# Patient Record
Sex: FEMALE | Race: WHITE | NOT HISPANIC OR LATINO | Employment: OTHER | URBAN - METROPOLITAN AREA
[De-identification: names, ages, dates, MRNs, and addresses within clinical notes are randomized per-mention and may not be internally consistent; named-entity substitution may affect disease eponyms.]

---

## 2024-08-10 ENCOUNTER — HOSPITAL ENCOUNTER (EMERGENCY)
Facility: CLINIC | Age: 79
Discharge: HOME | End: 2024-08-10
Payer: COMMERCIAL

## 2024-08-10 VITALS
TEMPERATURE: 98.6 F | SYSTOLIC BLOOD PRESSURE: 145 MMHG | HEART RATE: 85 BPM | RESPIRATION RATE: 16 BRPM | OXYGEN SATURATION: 96 % | DIASTOLIC BLOOD PRESSURE: 73 MMHG

## 2024-08-10 DIAGNOSIS — S01.81XA FACIAL LACERATION, INITIAL ENCOUNTER: ICD-10-CM

## 2024-08-10 PROCEDURE — 12013 RPR F/E/E/N/L/M 2.6-5.0 CM: CPT

## 2024-08-10 PROCEDURE — G0463 HOSPITAL OUTPT CLINIC VISIT: HCPCS | Mod: 25

## 2024-08-10 ASSESSMENT — COLUMBIA-SUICIDE SEVERITY RATING SCALE - C-SSRS
1. IN THE PAST MONTH, HAVE YOU WISHED YOU WERE DEAD OR WISHED YOU COULD GO TO SLEEP AND NOT WAKE UP?: NO
2. HAVE YOU ACTUALLY HAD ANY THOUGHTS OF KILLING YOURSELF IN THE PAST MONTH?: NO
6. HAVE YOU EVER DONE ANYTHING, STARTED TO DO ANYTHING, OR PREPARED TO DO ANYTHING TO END YOUR LIFE?: NO

## 2024-08-10 ASSESSMENT — ACTIVITIES OF DAILY LIVING (ADL): ADLS_ACUITY_SCORE: 35

## 2024-08-10 NOTE — ED PROVIDER NOTES
History   No chief complaint on file.    HPI  Glenna Jones is a 79 year old female who presents to urgent care with chief complaint of facial laceration.  Patient reports that she was trying to catch her members dog who she was  prior to arrival when she fell forward hitting her face on the ground.  She was wearing sunglasses and the sunglasses made superficial lacerations just inferior to right eyebrow and lateral to left nostril.  Bleeding well-controlled at this time.  Patient denies loss of consciousness or other injury.  Patient not on blood thinning medications.  Patient is up-to-date on tetanus vaccine.    Allergies:  Not on File    Problem List:    There are no problems to display for this patient.       Past Medical History:    No past medical history on file.    Past Surgical History:    No past surgical history on file.    Family History:    No family history on file.    Social History:  Marital Status:   [2]        Medications:    No current outpatient medications on file.        Review of Systems   All other systems reviewed and are negative.      Physical Exam   BP: (!) 145/73  Pulse: 85  Temp: 98.6  F (37  C)  Resp: 16  SpO2: 96 %      Physical Exam  Vitals and nursing note reviewed.   Constitutional:       General: She is not in acute distress.     Appearance: Normal appearance. She is not toxic-appearing.   Eyes:      Extraocular Movements: Extraocular movements intact.      Conjunctiva/sclera: Conjunctivae normal.      Pupils: Pupils are equal, round, and reactive to light.   Cardiovascular:      Rate and Rhythm: Normal rate.      Pulses: Normal pulses.   Pulmonary:      Effort: Pulmonary effort is normal.   Musculoskeletal:      Cervical back: Neck supple. No rigidity.   Skin:     Comments: Bruising present around lateral right eyelid.  There is superficial laceration approximately 1 cm in length just inferior to right eyebrow.  There is another superficial laceration just  lateral to left nostril.  This laceration is approximately 2.5 cm long   Neurological:      General: No focal deficit present.      Mental Status: She is alert.      Sensory: No sensory deficit.      Motor: No weakness.   Psychiatric:         Mood and Affect: Mood normal.         Behavior: Behavior normal.         ED [facility]    -Laceration Repair    Date/Time: 8/10/2024 3:14 PM    Performed by: Slime Lofton PA-C  Authorized by: Slime Lofton PA-C    Risks, benefits and alternatives discussed.      ANESTHESIA (see MAR for exact dosages):     Anesthesia method:  None  LACERATION DETAILS     Location:  Face    Face location:  R eyebrow (Left cheek)    Length (cm):  1 (Left cheek: 2.5 cm)    REPAIR TYPE:     Repair type:  Simple      TREATMENT:     Area cleansed with:  Hibiclens    Amount of cleaning:  Standard    Irrigation solution:  Sterile water    Irrigation method:  Syringe    Visualized foreign bodies/material removed: no      SKIN REPAIR     Repair method:  Tissue adhesive    APPROXIMATION     Approximation:  Loose    POST-PROCEDURE DETAILS     Dressing:  Open (no dressing)      PROCEDURE    Patient Tolerance:  Patient tolerated the procedure well with no immediate complications             No results found for this or any previous visit (from the past 24 hour(s)).    Medications - No data to display    Assessments & Plan (with Medical Decision Making)     I have reviewed the nursing notes.    I have reviewed the findings, diagnosis, plan and need for follow up with the patient.    Medical Decision Making    Patient is a 79 year old female who presents to urgent care with chief complaint of facial laceration.  Patient reports that she was trying to catch her members dog who she was  prior to arrival when she fell forward hitting her face on the ground.  She was wearing sunglasses and the sunglasses made superficial lacerations just inferior to  right eyebrow and lateral to left nostril.  Bleeding well-controlled at this time.  Patient denies loss of consciousness or other injury.  Patient not on blood thinning medications.  Patient is up-to-date on tetanus vaccine.    Exam above.  Significant for: Bruising present around lateral right eyelid.  There is superficial laceration approximately 1 cm in length just inferior to right eyebrow.  There is another superficial laceration just lateral to left nostril.  This laceration is approximately 2.5 cm long.    Procedure stated above.  Tissue adhesive applied to both lacerations.  Patient tolerated procedure well.  Skin glue does not interfere with eye closure.    Patient should follow-up if signs of infection develop including spreading redness or purulent discharge.      Prior to making a final disposition on this patient the results of patient's tests and other diagnostic studies were discussed with the patient. All questions were answered. Patient expressed understanding of the plan and was amenable to it.     Disclaimer: This note consists of symbols derived from keyboarding, dictation and/or voice recognition software. As a result, there may be errors in the script that have gone undetected. Please consider this when interpreting information found in this chart.      New Prescriptions    No medications on file       Final diagnoses:   Facial laceration, initial encounter       8/10/2024   [facility] EMERGENCY DEPT       Slime Lofton PA-C  08/10/24 [id number]

## 2024-08-10 NOTE — DISCHARGE INSTRUCTIONS
Keep the wound clean and dry.  You can shower and wash it with soap and water.  No hot tubs, swimming or soaking until the wound is healed.   You can use Tylenol or ibuprofen for pain.  Return to the ER if you develop any fever, pus coming from the wound, severe pain or any other new or concerning symptoms.

## 2024-10-05 ENCOUNTER — HOSPITAL ENCOUNTER (EMERGENCY)
Facility: CLINIC | Age: 79
Discharge: HOME | End: 2024-10-05
Attending: EMERGENCY MEDICINE | Admitting: EMERGENCY MEDICINE
Payer: COMMERCIAL

## 2024-10-05 VITALS
HEART RATE: 93 BPM | BODY MASS INDEX: 21.6 KG/M2 | TEMPERATURE: 97.9 F | DIASTOLIC BLOOD PRESSURE: 88 MMHG | RESPIRATION RATE: 18 BRPM | OXYGEN SATURATION: 96 % | WEIGHT: 110 LBS | SYSTOLIC BLOOD PRESSURE: 128 MMHG | HEIGHT: 60 IN

## 2024-10-05 DIAGNOSIS — L03.213 PRESEPTAL CELLULITIS OF RIGHT EYE: ICD-10-CM

## 2024-10-05 PROCEDURE — 99284 EMERGENCY DEPT VISIT MOD MDM: CPT | Performed by: EMERGENCY MEDICINE

## 2024-10-05 PROCEDURE — 99283 EMERGENCY DEPT VISIT LOW MDM: CPT

## 2024-10-05 RX ORDER — SULFAMETHOXAZOLE AND TRIMETHOPRIM 800; 160 MG/1; MG/1
1 TABLET ORAL 2 TIMES DAILY
Qty: 10 TABLET | Refills: 0 | Status: SHIPPED | OUTPATIENT
Start: 2024-10-05 | End: 2024-10-10

## 2024-10-05 ASSESSMENT — VISUAL ACUITY
OD: 20/40
OS: 20/50

## 2024-10-05 ASSESSMENT — COLUMBIA-SUICIDE SEVERITY RATING SCALE - C-SSRS
6. HAVE YOU EVER DONE ANYTHING, STARTED TO DO ANYTHING, OR PREPARED TO DO ANYTHING TO END YOUR LIFE?: NO
2. HAVE YOU ACTUALLY HAD ANY THOUGHTS OF KILLING YOURSELF IN THE PAST MONTH?: NO
1. IN THE PAST MONTH, HAVE YOU WISHED YOU WERE DEAD OR WISHED YOU COULD GO TO SLEEP AND NOT WAKE UP?: NO

## 2024-10-05 ASSESSMENT — ACTIVITIES OF DAILY LIVING (ADL): ADLS_ACUITY_SCORE: 35

## 2024-10-05 NOTE — DISCHARGE INSTRUCTIONS
Take antibiotics twice daily as prescribed for 5 full days.    Follow-up your eye doctor next week.    Return to emergency department if you have new or worsening symptoma.

## 2024-10-05 NOTE — ED PROVIDER NOTES
History     Chief Complaint   Patient presents with    Eye Problem     HPI  Glenna Jones is a 79 year old female with history two days of left eye redness and swelling. No eye pain or reported change in vision. No double vision.  Had dilated eye exam on 10/2 which she reports is unremarkable.  Had no symptoms at that time.  Eye feels itchy.  No active drainage.  Does not use corrective lenses.  Otherwise feeling well.  No fevers or chills ear pain upper respiratory symptoms.  Denies any traumatic injury.      Allergies:  No Known Allergies    Problem List:    There are no problems to display for this patient.       Past Medical History:    No past medical history on file.    Past Surgical History:    No past surgical history on file.    Family History:    No family history on file.    Social History:  Marital Status:   [2]        Medications:    sulfamethoxazole-trimethoprim (BACTRIM DS) 800-160 MG tablet          Review of Systems  Pertinent positives and negatives mentioned in HPI    Physical Exam   BP: 128/88  Pulse: 88  Temp: 97.9  F (36.6  C)  Resp: 18  Height: 152.4 cm (5')  Weight: 49.9 kg (110 lb)  SpO2: 97 %    GEN: Awake, alert, and cooperative.   HENT: no facial tenderness.   EYES: EOM intact. Left conjunctiva injected. No chemosis. No RAPD. No pain w/ EOM. Mild edema and erythema of upper and lower left lids. No proptosis. IOP: OS 9/11/2011, OD, 8/9/2010.  Visual acuity: OD 20/50 OS 20/40.  NECK: Symmetric, freely mobile.     ED Course        Procedures                 Critical Care time:  none           No results found for this or any previous visit (from the past 24 hour(s)).    Medications - No data to display    Assessments & Plan (with Medical Decision Making)   79 year old female with 2 days of edema, itching, redness of left eye and lids detailed above.  Normal pressures.  Visual acuity slightly better and affected eye.  No reported traumatic injury.  No RAPD.  Will treat for preseptal  cellulitis with 5-day course of Bactrim.  Prescription sent to preferred pharmacy.  With her normal pressures, lack of pain, fever or infectious symptoms, I think orbital cellulitis less likely and do not think that advanced imaging of head necessary at this time.  Okay for her to follow-up in clinic.  Has establish care with eye doctor.  ED return precautions discussed.          I have reviewed the nursing notes.         Discharge Medication List as of 10/5/2024  1:41 PM        START taking these medications    Details   sulfamethoxazole-trimethoprim (BACTRIM DS) 800-160 MG tablet Take 1 tablet by mouth 2 times daily for 5 days., Disp-10 tablet, R-0, E-Prescribe             Final diagnoses:   Preseptal cellulitis of right eye     Jose L Argueta MD        10/5/2024   [facility] EMERGENCY DEPT    Disclaimer: This note consists of words and symbols derived from keyboarding and dictation using voice recognition software.  As a result, there may be errors that have gone undetected.  Please consider this when interpreting information found in this note.               Jose L Argueta MD  10/05/24 0661

## 2024-10-05 NOTE — ED NOTES
"Pt presents to ER with L jose maria orbital edema and redness. She reports that she awoke with it this a.m.  denies pain; states it itches.     Pt states that she has \"problems with my eyelids. I have a spray I'm supposed to use BID to keep my eyelids from swelling but I'm not very good about it. It's never swelled this much before though\". Pt does not know what the condition is called nor the medication she uses for it.   "